# Patient Record
Sex: MALE | Race: WHITE | NOT HISPANIC OR LATINO | Employment: OTHER | URBAN - METROPOLITAN AREA
[De-identification: names, ages, dates, MRNs, and addresses within clinical notes are randomized per-mention and may not be internally consistent; named-entity substitution may affect disease eponyms.]

---

## 2020-07-09 ENCOUNTER — NEW PATIENT COMPREHENSIVE (OUTPATIENT)
Dept: URBAN - METROPOLITAN AREA CLINIC 46 | Facility: CLINIC | Age: 65
End: 2020-07-09

## 2020-07-09 DIAGNOSIS — H25.813: ICD-10-CM

## 2020-07-09 DIAGNOSIS — H04.123: ICD-10-CM

## 2020-07-09 DIAGNOSIS — E11.9: ICD-10-CM

## 2020-07-09 PROCEDURE — 92004 COMPRE OPH EXAM NEW PT 1/>: CPT

## 2020-07-09 PROCEDURE — 92015 DETERMINE REFRACTIVE STATE: CPT

## 2020-07-09 ASSESSMENT — TONOMETRY
OS_IOP_MMHG: 17
OD_IOP_MMHG: 17

## 2020-07-09 ASSESSMENT — VISUAL ACUITY
OS_SC: 20/400
OS_SC: J2
OS_CC: J5
OD_CC: 20/30
OD_SC: 20/60-2
OD_SC: J1
OD_CC: J3

## 2022-05-26 NOTE — PATIENT DISCUSSION
Needs visual field testing, taped and untaped, to document that patient has met criteria for insurance reimbursement.

## 2022-05-26 NOTE — PATIENT DISCUSSION
Moderate Dry Eyes: Prescribed disappearing preservative or preservative-free artificial tears 4-6x per day OU and the daily intake of Omega-3 DHA/EPA fatty acids to help relieve symptoms.  Add nightly lubricating ointment or gel or if symptoms persist. Return for follow-up as scheduled or sooner if symptoms persist.

## 2023-04-20 NOTE — PATIENT DISCUSSION
1st.
Moderate Dry Eyes: Prescribed disappearing preservative or preservative-free artificial tears 4-6x per day OU and the daily intake of Omega-3 DHA/EPA fatty acids to help relieve symptoms.  Add nightly lubricating ointment or gel or if symptoms persist. Return for follow-up as scheduled or sooner if symptoms persist.
Needs visual field testing, taped and untaped, to document that patient has met criteria for insurance reimbursement.
No active Diabetic Retinopathy is present on examination.
Recommended yearly dilated eye examinations.
SCHEDULE CAT SX OS STANDARD [id number] 21. 5.
VISUALLY SIGNIFICANT: I have discussed the option of glasses versus cataract surgery versus following. It was explained that when the patients vision no longer meets their visual needs and a glasses prescription does not improve visual symptoms, the option of cataract surgery is a reasonable next step. It was explained that there is no guarantee that removing the cataract will improve their visual symptoms, however; it is believed that the cataract is contributing to the patient's visual impairment and surgery may improve both the visual and functional status of the patient. The risks, benefits and alternatives of surgery were discussed with the patient. After this discussion, the patient desires to proceed with cataract surgery with implantation of an intraocular lens to improve vision.
will wait until after cataract sx has been completed.
[de-identified] : Left forearm significant decrease in size of volar forearm wound, no ~6 cm x 3cm. spotty sensation in radial sensory distribution, densely poor sensation in median nerve distribution. 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